# Patient Record
Sex: FEMALE | Race: WHITE | NOT HISPANIC OR LATINO | URBAN - METROPOLITAN AREA
[De-identification: names, ages, dates, MRNs, and addresses within clinical notes are randomized per-mention and may not be internally consistent; named-entity substitution may affect disease eponyms.]

---

## 2018-02-05 ENCOUNTER — EMERGENCY (EMERGENCY)
Facility: HOSPITAL | Age: 28
LOS: 1 days | Discharge: HOME | End: 2018-02-05
Attending: EMERGENCY MEDICINE
Payer: SELF-PAY

## 2018-02-05 VITALS
WEIGHT: 190.04 LBS | HEIGHT: 66 IN | SYSTOLIC BLOOD PRESSURE: 117 MMHG | DIASTOLIC BLOOD PRESSURE: 90 MMHG | RESPIRATION RATE: 16 BRPM | TEMPERATURE: 98 F | HEART RATE: 72 BPM | OXYGEN SATURATION: 99 %

## 2018-02-05 PROCEDURE — 99284 EMERGENCY DEPT VISIT MOD MDM: CPT | Mod: 25

## 2018-02-05 PROCEDURE — 73562 X-RAY EXAM OF KNEE 3: CPT

## 2018-02-05 PROCEDURE — 12011 RPR F/E/E/N/L/M 2.5 CM/<: CPT

## 2018-02-05 PROCEDURE — 73590 X-RAY EXAM OF LOWER LEG: CPT

## 2018-02-05 PROCEDURE — 73562 X-RAY EXAM OF KNEE 3: CPT | Mod: 26,RT

## 2018-02-05 PROCEDURE — 73590 X-RAY EXAM OF LOWER LEG: CPT | Mod: 26,RT

## 2018-02-05 RX ORDER — ACETAMINOPHEN 500 MG
650 TABLET ORAL ONCE
Qty: 0 | Refills: 0 | Status: COMPLETED | OUTPATIENT
Start: 2018-02-05 | End: 2018-02-05

## 2018-02-05 RX ORDER — LIDOCAINE HCL 20 MG/ML
5 VIAL (ML) INJECTION ONCE
Qty: 0 | Refills: 0 | Status: COMPLETED | OUTPATIENT
Start: 2018-02-05 | End: 2018-02-05

## 2018-02-05 RX ADMIN — Medication 5 MILLILITER(S): at 09:53

## 2018-02-05 RX ADMIN — Medication 650 MILLIGRAM(S): at 09:52
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
## 2018-02-05 NOTE — ED STATDOCS - ATTENDING CONTRIBUTION TO CARE
I, Celso Loza, performed the initial face to face bedside interview with this patient regarding history of present illness, review of symptoms and relevant past medical, social and family history.  I completed an independent physical examination.  I was the provider who initially evaluated this patient.  Follow-up on ordered tests (ie labs, radiologic studies) and re-evaluation of the patient's status has been communicated to the ACP.  Disposition of the patient will be based on test outcome and response to ED interventions.
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
## 2018-02-05 NOTE — ED ADULT NURSE NOTE - CHPI ED SYMPTOMS NEG
no headache/no disorientation/no crying/no decreased eating/drinking/no bruising/no dizziness/no neck tenderness/no difficulty bearing weight/no sleeping issues/no back pain/no loss of consciousness/no fussiness

## 2018-02-09 ENCOUNTER — EMERGENCY (EMERGENCY)
Facility: HOSPITAL | Age: 28
LOS: 1 days | Discharge: HOME | End: 2018-02-09
Admitting: EMERGENCY MEDICINE
Payer: SELF-PAY

## 2018-02-09 VITALS
WEIGHT: 220.02 LBS | SYSTOLIC BLOOD PRESSURE: 126 MMHG | HEART RATE: 89 BPM | TEMPERATURE: 98 F | RESPIRATION RATE: 18 BRPM | DIASTOLIC BLOOD PRESSURE: 77 MMHG | OXYGEN SATURATION: 99 % | HEIGHT: 66 IN

## 2018-02-09 PROCEDURE — G0463: CPT

## 2018-02-09 NOTE — ED PROVIDER NOTE - ATTENDING CONTRIBUTION TO CARE
Seen with ACP; patient for suture check; wound looks good, suture removal performed without incident. OK for d/c    I, Johny Marie, performed the initial face to face bedside interview with this patient regarding history of present illness, review of symptoms and relevant past medical, social and family history.  I completed an independent physical examination.  I was the initial provider who evaluated this patient. I have signed out the follow up of any pending tests (i.e. labs, radiological studies) to the ACP.  I have communicated the patient’s plan of care and disposition with the ACP.

## 2018-02-09 NOTE — ED PROVIDER NOTE - OBJECTIVE STATEMENT
28 yo F presented to ED for suture removal. Pt has sutured placed 4 days ago. Pt denies any redness or d/c

## 2018-02-09 NOTE — ED ADULT NURSE NOTE - OBJECTIVE STATEMENT
pt alert and awake x3, arrived to ED for suture removal, pt denies pain, denies chest pain/sob, LS clear.

## 2018-07-08 ENCOUNTER — TRANSCRIPTION ENCOUNTER (OUTPATIENT)
Age: 28
End: 2018-07-08

## 2018-07-22 ENCOUNTER — TRANSCRIPTION ENCOUNTER (OUTPATIENT)
Age: 28
End: 2018-07-22

## 2018-08-02 PROCEDURE — 99203 OFFICE O/P NEW LOW 30 MIN: CPT
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
## 2018-09-06 ENCOUNTER — EMERGENCY (EMERGENCY)
Facility: HOSPITAL | Age: 28
LOS: 1 days | Discharge: HOME | End: 2018-09-06
Attending: STUDENT IN AN ORGANIZED HEALTH CARE EDUCATION/TRAINING PROGRAM
Payer: MEDICAID

## 2018-09-06 PROCEDURE — 99283 EMERGENCY DEPT VISIT LOW MDM: CPT | Mod: 25

## 2018-09-06 PROCEDURE — 99053 MED SERV 10PM-8AM 24 HR FAC: CPT

## 2018-09-07 VITALS
OXYGEN SATURATION: 99 % | HEIGHT: 66 IN | DIASTOLIC BLOOD PRESSURE: 78 MMHG | SYSTOLIC BLOOD PRESSURE: 115 MMHG | RESPIRATION RATE: 19 BRPM | WEIGHT: 220.02 LBS | TEMPERATURE: 98 F | HEART RATE: 57 BPM

## 2018-09-07 VITALS
OXYGEN SATURATION: 98 % | RESPIRATION RATE: 18 BRPM | HEART RATE: 61 BPM | SYSTOLIC BLOOD PRESSURE: 116 MMHG | TEMPERATURE: 98 F | DIASTOLIC BLOOD PRESSURE: 79 MMHG

## 2018-09-07 PROCEDURE — 99282 EMERGENCY DEPT VISIT SF MDM: CPT

## 2018-09-07 RX ORDER — ACETAMINOPHEN 500 MG
975 TABLET ORAL ONCE
Qty: 0 | Refills: 0 | Status: COMPLETED | OUTPATIENT
Start: 2018-09-07 | End: 2018-09-07

## 2018-09-07 RX ADMIN — Medication 975 MILLIGRAM(S): at 02:33
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
## 2018-10-02 ENCOUNTER — EMERGENCY (EMERGENCY)
Facility: HOSPITAL | Age: 28
LOS: 1 days | Discharge: HOME | End: 2018-10-02
Attending: EMERGENCY MEDICINE
Payer: MEDICAID

## 2018-10-02 VITALS
RESPIRATION RATE: 19 BRPM | SYSTOLIC BLOOD PRESSURE: 134 MMHG | DIASTOLIC BLOOD PRESSURE: 85 MMHG | HEART RATE: 88 BPM | OXYGEN SATURATION: 96 % | TEMPERATURE: 98 F

## 2018-10-02 VITALS — HEIGHT: 66 IN | WEIGHT: 220.02 LBS

## 2018-10-02 PROCEDURE — 94640 AIRWAY INHALATION TREATMENT: CPT

## 2018-10-02 PROCEDURE — 71046 X-RAY EXAM CHEST 2 VIEWS: CPT | Mod: 26

## 2018-10-02 PROCEDURE — 99285 EMERGENCY DEPT VISIT HI MDM: CPT | Mod: 25

## 2018-10-02 PROCEDURE — 71046 X-RAY EXAM CHEST 2 VIEWS: CPT

## 2018-10-02 PROCEDURE — 99284 EMERGENCY DEPT VISIT MOD MDM: CPT

## 2018-10-02 RX ORDER — IBUPROFEN 200 MG
800 TABLET ORAL ONCE
Qty: 0 | Refills: 0 | Status: COMPLETED | OUTPATIENT
Start: 2018-10-02 | End: 2018-10-02

## 2018-10-02 RX ORDER — AZITHROMYCIN 500 MG/1
1 TABLET, FILM COATED ORAL
Qty: 1 | Refills: 0 | OUTPATIENT
Start: 2018-10-02 | End: 2018-10-06

## 2018-10-02 RX ORDER — ALBUTEROL 90 UG/1
2.5 AEROSOL, METERED ORAL
Qty: 0 | Refills: 0 | Status: COMPLETED | OUTPATIENT
Start: 2018-10-02 | End: 2018-10-02

## 2018-10-02 RX ADMIN — Medication 800 MILLIGRAM(S): at 21:01

## 2018-10-02 RX ADMIN — ALBUTEROL 2.5 MILLIGRAM(S): 90 AEROSOL, METERED ORAL at 20:43

## 2018-10-02 RX ADMIN — ALBUTEROL 2.5 MILLIGRAM(S): 90 AEROSOL, METERED ORAL at 21:01

## 2018-10-02 RX ADMIN — Medication 800 MILLIGRAM(S): at 20:43

## 2018-10-02 RX ADMIN — Medication 40 MILLIGRAM(S): at 20:43

## 2018-10-02 RX ADMIN — ALBUTEROL 2.5 MILLIGRAM(S): 90 AEROSOL, METERED ORAL at 20:40

## 2018-10-02 NOTE — ED STATDOCS - OBJECTIVE STATEMENT
27 y/o F pt with PMHx asthma, PNAx2, influenza presents to the ED c/o gradual onset URI symptoms that began 2 days ago.  She felt mild symptoms 2 days ago that she thought were allergies.  She notes having a fever yesterday, and coughing, congestion, SOB, wheezing, nausea, and ear pain.  PT uses an inhaler for her asthma, using it more frequently today.  Pt has never been hospitalized or intubated.  Pt is sexually active, and has an IUD.  Denies chills, vaginal discharge, dysuria, hematuria, vomiting, diarrhea, sore throat.  No further acute complaints at this time.

## 2018-10-02 NOTE — ED STATDOCS - PHYSICAL EXAMINATION
Constitutional: in no apparent distress, speaks in full sentences  HEENT: neck supple, FROM, tongue is pink, slightly dry and midline, no retractions, no stridor, no erythema or exudates  EYES: non-injected, non-icteric, PERRL, EOMI  RESPIRATORY: lungs clear, no wheezing  CARDIAC: S1 S2, regular rate, moving chest wall symmetrically  GI: bowel sounds present, abdomen soft, non-tender  : no CVA tenderness  MSK: spine is midline  SKIN: no jaundice  NEURO: awake and alert
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
## 2018-10-02 NOTE — ED STATDOCS - ATTENDING CONTRIBUTION TO CARE
I, Omar Jay, performed the initial face to face bedside interview with this patient regarding history of present illness, review of symptoms and relevant past medical, social and family history.  I completed an independent physical examination.  I was the initial provider who evaluated this patient. I have signed out the follow up of any pending tests (i.e. labs, radiological studies) to the ACP.  I have communicated the patient’s plan of care and disposition with the ACP.

## 2018-10-02 NOTE — ED ADULT TRIAGE NOTE - CHIEF COMPLAINT QUOTE
I think I have pneumonia, last had it in 2014. I have body aches all over and it hurts to breath. productive brown/green sputum. lungs CTA

## 2018-10-02 NOTE — ED STATDOCS - PROGRESS NOTE DETAILS
Pt seen by intake MD and agree with HPI/ROS/PE/Plan. CXR shows possible infiltrate. Will dc with z pack and PCP f/u.

## 2018-10-02 NOTE — ED STATDOCS - MEDICAL DECISION MAKING DETAILS
asthma exacerbation due to URI symptoms, x-ray for PNA, treat symptomatically, check results and reassess.

## 2018-10-02 NOTE — ED STATDOCS - CARE PLAN
Principal Discharge DX:	Pneumonia Principal Discharge DX:	Pneumonia  Secondary Diagnosis:	Mild asthma, unspecified whether complicated, unspecified whether persistent

## 2020-01-03 ENCOUNTER — RESULT REVIEW (OUTPATIENT)
Age: 30
End: 2020-01-03

## 2020-09-29 ENCOUNTER — TRANSCRIPTION ENCOUNTER (OUTPATIENT)
Age: 30
End: 2020-09-29

## 2020-10-09 ENCOUNTER — RESULT REVIEW (OUTPATIENT)
Age: 30
End: 2020-10-09

## 2020-11-21 NOTE — ED STATDOCS - RADIOGRAPHS
Please note that my documentation in this Electronic Healthcare Record was produced using speech recognition software and therefore may contain errors related to that software.These could include grammar, punctuation and spelling errors or the inclusion/ exclusion of phrases that were not intended. Please contact myself for any clarification, questions or concerns.    HPI: Patient is a 29 y.o. female who presents with the chief complaint of URI like symptoms x 4 days. 2 family members tested positive for strep. Pt experiencing rhinorrhea, congestion, sore throat, bilateral ear popping, and cough. Denies fever or chills.  Denies any chest pain or difficulty breathing, abdominal pain, lightheadedness, dizziness, diarrhea.  She is approximately 6 weeks pregnant.  She is taking prenatal vitamins.    REVIEW OF SYSTEMS - 10 systems were independently reviewed and are otherwise negative with the exception of those items previously documented in the HPI and nursing notes.    Allergy: Buspar [buspirone] and Penicillins    Past medical history:   Past Medical History:   Diagnosis Date    Anxiety     Depression     Gallstones 2013    History of weight loss surgery 2018    Hypertension        Surgical History:   Past Surgical History:   Procedure Laterality Date    BARIATRIC SURGERY  2018     SECTION      CHOLECYSTECTOMY         Social history:   Social History     Socioeconomic History    Marital status: Single     Spouse name: Not on file    Number of children: Not on file    Years of education: Not on file    Highest education level: Not on file   Occupational History    Not on file   Social Needs    Financial resource strain: Not hard at all    Food insecurity     Worry: Never true     Inability: Never true    Transportation needs     Medical: No     Non-medical: No   Tobacco Use    Smoking status: Never Smoker    Smokeless tobacco: Never Used   Substance and Sexual Activity    Alcohol  "use: Yes     Frequency: 2-4 times a month     Drinks per session: 1 or 2     Binge frequency: Never     Comment: OCCASIONAL    Drug use: No    Sexual activity: Yes     Partners: Male     Birth control/protection: None   Lifestyle    Physical activity     Days per week: 3 days     Minutes per session: 60 min    Stress: Not at all   Relationships    Social connections     Talks on phone: More than three times a week     Gets together: Three times a week     Attends [employer] service: Not on file     Active member of club or organization: No     Attends meetings of clubs or organizations: Patient refused     Relationship status: Living with partner   Other Topics Concern    Not on file   Social History Narrative    Not on file       Family history: non-contributory    EHR: reviewed    Vitals: /85 (BP Location: Left arm, Patient Position: Sitting)   Pulse 75   Temp 98.4 °F (36.9 °C) (Oral)   Resp 18   Ht 5' 1" (1.549 m)   Wt 108.9 kg (240 lb)   LMP 10/18/2020   SpO2 100%   Breastfeeding No Comment: reports about 6 weeks pregnant  BMI 45.35 kg/m²     PHYSICAL EXAM:    General-29-year-old female awake and alert, oriented, GCS 15, in no acute distress,  HEENT- normocephalic, atraumatic, sclera anicteric, moist mucous membranes, PERRL, EOMI, bilateral TM's intact w/out erythema, effusion, or bulging. No oropharyngeal erythema, tonsillar enlargement, or exudates. Bilateral turbinates enlarged and pink.   CARDIOVASCULAR- regular rate and rhythm, no murmurs/rubs,/gallops, normal S1-S2  PULMONARY- nonlabored, no respiratory distress, clear to auscultation bilaterally, no wheezes/rhonchi/rales, chest expansion symmetrical  GASTROINTESTINAL- soft, nontender, nondistended  NEUROLOGIC- mental status normal, speech fluid, cognition normal, CN II-XII grossly intact, sensations equal normal bilateral upper and lower extremities, peripheral pulse 2 +/4, ambulatory with proper gait.  MUSCULOSKELETAL- " well-nourished, well-developed  DERMATOLOGIC- warm and dry, no visible rashes  PSYCHIATRIC- normal affect, normal concentration           Labs Reviewed - No data to display    No orders to display       MEDICAL DECISION MAKING: Patient is a 29 y.o. female who presented with chief complaint of URI like symptoms with known exposure to strep.  Patient does have some sore throat and they are like symptoms.  Her examination is unremarkable.  Given the exposure, she is sent home with azithromycin.  Did consider penicillin but she has an allergy.  Azithromycin is category B for pregnancy.  Advised on Tylenol, Flonase, and other cough suppressants that are approved for pregnancy.  Patient's vitals are stable and normal.  CLINICAL IMPRESSION:  1. Upper respiratory tract infection, unspecified type         NORM Craft  11/20/20 2016     r

## 2021-09-13 NOTE — ED ADULT TRIAGE NOTE - DIRECT TO ROOM CARE INITIATED:
Dr. Raj Gambino paged at this time. Per PICC team, need to discuss and get approval from Dr. Raj Gambino for PICC placement since creatinine is elevated. Awaiting call back at this time. 05-Feb-2018 08:53

## 2023-02-09 NOTE — ED ADULT NURSE NOTE - CHIEF COMPLAINT QUOTE
I think I have pneumonia, last had it in 2014. I have body aches all over and it hurts to breath. productive brown/green sputum. lungs CTA 09-Feb-2023 10:35

## 2024-09-30 NOTE — ED PROVIDER NOTE - NS_EDPROVIDERDISPOUSERTYPE_ED_A_ED
What Type Of Note Output Would You Prefer (Optional)?: Standard Output How Severe Is Your Skin Lesion?: moderate Has Your Skin Lesion Been Treated?: not been treated Is This A New Presentation, Or A Follow-Up?: Skin Lesion Scribe Attestation (For Scribes USE Only)... Attending Attestation (For Attendings USE Only).../Scribe Attestation (For Scribes USE Only)...